# Patient Record
Sex: FEMALE | Race: WHITE
[De-identification: names, ages, dates, MRNs, and addresses within clinical notes are randomized per-mention and may not be internally consistent; named-entity substitution may affect disease eponyms.]

---

## 2020-08-10 ENCOUNTER — HOSPITAL ENCOUNTER (OUTPATIENT)
Dept: HOSPITAL 46 - DS | Age: 70
Discharge: HOME | End: 2020-08-10
Attending: SURGERY
Payer: MEDICARE

## 2020-08-10 VITALS — BODY MASS INDEX: 19.63 KG/M2 | WEIGHT: 114.99 LBS | HEIGHT: 64 IN

## 2020-08-10 DIAGNOSIS — Z96.651: ICD-10-CM

## 2020-08-10 DIAGNOSIS — Z12.11: Primary | ICD-10-CM

## 2020-08-10 DIAGNOSIS — Z88.2: ICD-10-CM

## 2020-08-10 DIAGNOSIS — K31.9: ICD-10-CM

## 2020-08-10 DIAGNOSIS — K57.30: ICD-10-CM

## 2020-08-10 DIAGNOSIS — Z79.899: ICD-10-CM

## 2020-08-10 DIAGNOSIS — K21.9: ICD-10-CM

## 2020-08-10 DIAGNOSIS — B37.81: ICD-10-CM

## 2020-08-10 PROCEDURE — 0DB78ZX EXCISION OF STOMACH, PYLORUS, VIA NATURAL OR ARTIFICIAL OPENING ENDOSCOPIC, DIAGNOSTIC: ICD-10-PCS | Performed by: SURGERY

## 2020-08-10 PROCEDURE — 0DBP8ZX EXCISION OF RECTUM, VIA NATURAL OR ARTIFICIAL OPENING ENDOSCOPIC, DIAGNOSTIC: ICD-10-PCS | Performed by: SURGERY

## 2020-08-10 PROCEDURE — 0DB38ZX EXCISION OF LOWER ESOPHAGUS, VIA NATURAL OR ARTIFICIAL OPENING ENDOSCOPIC, DIAGNOSTIC: ICD-10-PCS | Performed by: SURGERY

## 2020-08-10 PROCEDURE — 0DB58ZX EXCISION OF ESOPHAGUS, VIA NATURAL OR ARTIFICIAL OPENING ENDOSCOPIC, DIAGNOSTIC: ICD-10-PCS | Performed by: SURGERY

## 2020-08-10 PROCEDURE — G0500 MOD SEDAT ENDO SERVICE >5YRS: HCPCS

## 2020-08-10 NOTE — NUR
08/10/20 0828 Teresa Jose
0820- PT TO PACU IN SUPINE POSITION. AWAKENS EASILY TO VOICE.
RESPONDING TO QUESTIONS APPROPRIATELY. DENIES PAIN OR NAUSEA.
BREATHING EASY AND UNLABORED. SPO2 >95% ON 3 L O2 VIA NC. PT
ENCOURAGED TO PASS GAS.
 
0827- MD AT BEDSIDE TALKING WITH PATIENT. PT AWAKENS EASILY TO VOICE
AND TALKING APPROPRIATELY WITH MD. BREATHING EASY AND UNLABORED. O2
TITRATED DOWN TO ROOM AIR. SPO2 >95%
.

## 2020-08-10 NOTE — NUR
PT ALERT, ORIENTED AND SEEMS VERY MUCH IN CONTROL. PT MENTIONED THAT THIS EGD
IS  FOLLOW-UP TO CHECK FOR BLEEDING. ALL QUESTIONS ASKED-ANSWERED. PT ASKED
FOR PRAYER. WILL FOLLOW AS NEEDED

## 2020-08-11 NOTE — PATH
[facility]
                                    [address], [place]  [address]
_________________________________________________________________________________________
                                                                 Signed   
 
 
 
SPECIMEN(S): A ANTRUM/PYLORUS
SPECIMEN(S): B LOWER ESOPHAGUS
SPECIMEN(S): C ESOPHAGUS PLAQUES
SPECIMEN(S): D RECTUM
 
SPECIMEN SOURCE:
A. ANTRUM/PYLORUS
B. LOWER ESOPHAGUS
C. ESOPHAGUS PLAQUES
D. RECTUM
 
CLINICAL HISTORY:
Melena, history of antral ulcers.
MICROSCOPIC DESCRIPTION:
Histologic sections of all submitted blocks are examined by light microscopy. 
These findings, together with the gross examination, support the pathologic 
diagnosis. 
 
FINAL PATHOLOGIC DIAGNOSIS:
A. Stomach, antrum/pylorus, biopsy:
-  Reactive gastropathy.
-  Negative for Helicobacter organisms on HE stain.
-  Negative for dysplasia or malignancy.
B.  Esophagus, lower, biopsy:
-  Squamous mucosa with minimal chronic inflammation and reactive changes, 
suggestive of mild reflux esophagitis. 
-  Negative for intestinal metaplasia, dysplasia, or malignancy.
C.  Esophagus, plaques, biopsy:
-  Candidal esophagitis.
-  Negative for intestinal metaplasia, dysplasia, or malignancy.
D.  Rectum, biopsy:
-  Rectal mucosa with no histopathologic abnormality.
-  Negative for dysplasia or malignancy.
NAL:cml:C2NR
 
GROSS DESCRIPTION:
Four specimens are received in four containers, labeled "RC."
A.  The specimen, labeled "RC, antrum biopsy," is received in formalin and 
consists of two tan soft tissue fragments that measure 0.2 cm in greatest 
dimension.  The specimen is entirely submitted in 
cassette (A1).
 
                                                                                    
_________________________________________________________________________________________
PATIENT NAME:     NATALI CARLOSN                    
MEDICAL RECORD #: [id number]            PATHOLOGY                     
          ACCT #: [id number]       ACCESSION #: [id number]     
DATE OF BIRTH:   [birth year]            REPORT #: [id number]       
PHYSICIAN:        YODIT RENTERIA              
PCP:              DAVID JUAN PA-C         
REPORT IS CONFIDENTIAL AND NOT TO BE RELEASED WITHOUT AUTHORIZATION
 
 
                                  [facility]
                                    [address], [place]  [address]
_________________________________________________________________________________________
                                                                 Signed   
 
 
B.  The specimen, labeled "RC, esophagus biopsy," is received in formalin and 
consists of two tan soft tissue fragments that measure 0.2 cm in greatest 
dimension.  The specimen is entirely submitted 
in cassette (B1).
C.  The specimen, labeled "RC, esophagus plaques biopsy," is received in 
formalin and consists of five tan soft tissue fragments that measure 0.2 cm in 
greatest dimension.  The specimen is entirely 
submitted in cassette (C1).
D.  The specimen, labeled "RC, rectum biopsy," is received in formalin and 
consists of three tan soft tissue fragments that measure 0.2 cm in greatest 
dimension.  The specimen is entirely submitted in 
cassette (D1).
 
JS (under the direct supervision of a pathologist)
The Gross Description was prepared using a voice recognition system.  The 
report was reviewed for accuracy; however, sound-alike word errors, addition 
and/or deletions may occur.  If there is any 
question about this report, please contact Client Services.
 
PERFORMING LABORATORY:
The technical component was performed by [facility], [address] [place] [id number] (Medical Director: Lynn Bolden MD; CLIA# [id number]). 
Professional interpretation was performed by 
[facility], [address] [address] (CLIA# [id number]). 
 
Diagnostician:  Meka Kinney MD
Pathologist
Electronically Signed 08/11/2020
 
 
Copies:                                
~
 
 
 
 
 
 
 
 
 
 
                                                                                    
_________________________________________________________________________________________
PATIENT NAME:     NATALI CARLOS                    
MEDICAL RECORD #: [id number]            PATHOLOGY                     
          ACCT #: [id number]       ACCESSION #: [id number]     
DATE OF BIRTH:   [birth year]            REPORT #: [id number]       
PHYSICIAN:        YODIT PATHOLOGY              
PCP:              DAVID JUAN PA-C         
REPORT IS CONFIDENTIAL AND NOT TO BE RELEASED WITHOUT AUTHORIZATION

## 2020-08-11 NOTE — OR
[facility]
                                    [address], [place]  [address]
_________________________________________________________________________________________
                                                                 Signed   
 
 
DATE OF OPERATION:
08/10/2020
 
SURGEON:
Bob Crawley MD
 
PREOPERATIVE DIAGNOSES:
1. History of upper gastrointestinal bleeding in 2019 with pre-pyloric antral ulcers.
2. Surveillance history of mild proctitis.
 
POSTOPERATIVE DIAGNOSES:
1. Healed gastric ulcers.
2. Benign-appearing midesophageal plaques (biopsied).
3. Scattered diverticula of colon.
4. Mild proctitis (biopsied).
 
PROCEDURES:
1. Esophagogastroduodenoscopy with biopsy.
2. Total colonoscopy to cecum with rectal biopsy.
 
ANESTHESIA:
Intravenous sedation, fentanyl 150 mcg and Versed 5 mg.
 
INDICATIONS:
A 70-year-old white woman is a patient of David Bergman.  Last summer, she had melena
and anemia and was evaluated by Dr. Arias in August including upper endoscopy, which
showed pre-pyloric antral ulcerations.  She was treated with Protonix.  The patient does
take Fosamax and has maintained treatment with proton pump inhibitor therapy as well.
Additionally, she has a history of mild proctitis on prior colonoscopy.  She is admitted
at this time to undergo surveillance colonoscopy as well as upper endoscopy.  She
understands the risks of bleeding, infection, and perforation. 
 
FINDINGS:
Upper endoscopy showed no sign of persistent ulceration of the stomach in any way.
CLOtest was negative. 
 
There were esophageal plaques in the mid esophagus, which were biopsied and of little
consequence most likely. 
 
On colonoscopy, the prep was excellent.  Complete colonoscopy was undertaken to the
cecum.  There were scattered diverticula throughout.  There was no sign of stricture
neoplasm, no polyps.  There was persistent mild proctitis, for which biopsies were
 
    Electronically Signed By: BOB CRAWLEY MD  08/11/20 0748
_________________________________________________________________________________________
PATIENT NAME:     NATALI CARLOS                    
MEDICAL RECORD #: [id number]            OPERATIVE REPORT              
          ACCT #: [id number]  
DATE OF BIRTH:   [birth year]            REPORT #: [id number]      
PHYSICIAN:        BOB CRAWLEY MD                 
PCP:              DAVID BERGMAN PA-C         
REPORT IS CONFIDENTIAL AND NOT TO BE RELEASED WITHOUT AUTHORIZATION
 
 
                                  [facility]
                                    [address], [place]  [address]
_________________________________________________________________________________________
                                                                 Signed   
 
 
obtained. 
 
DESCRIPTION OF PROCEDURE:
The patient was brought to the endoscopy suite, given topical Hurricaine spray.
Hypopharyngeal anesthesia and placed in lateral decubitus position.  She was given
intravenous sedation to the point of slurred speech and nystagmus with full
cardiopulmonary monitoring.  A bite block was placed.  An Olympus video upper endoscope
passed by hypopharynx, vocal cords were normal.  Scope was passed down the esophagus,
where a few small plaques were noted in the mid esophagus.  Scope was passed to the
stomach, which was insufflated with air.  Rugal folds were normal.  There was no sign of
ulceration and close inspection of the antrum and pylorus showed no sign of ulceration
or other abnormalities.  Scope was passed through the pylorus into the duodenum, which
was normal.  The scope was withdrawn.  Biopsies taken of the antrum for both NIRALI and
pathologic testing.  Retroflexed view was undertaken showing a good flap valve and no
sign of proximal ulceration or gastritis.  The scope was withdrawn to the distal
esophagus.  Biopsies were obtained there, as there was mild inflammation possibly.  The
scope was withdrawn and a white mid esophageal plaques were noted.  There were not
worrisome in appearance, but were biopsied nevertheless.  Further withdrawal of scope
showed no sign of other abnormality.  There was no sign of Butcher epithelium. 
 
Plans were then made for colonoscopy.  Additional sedation was given.  Digital rectal
examination was found to be normal.  An Olympus video colonoscope was passed into the
rectum and manipulated throughout the colon, showing an excellent bowel prep overall.
The scope was ultimately advanced to the cecum.  The cecum appeared normal as did the
appendiceal orifice.  The scope was withdrawn.  Careful inspection showed no sign of
abnormality other than a few scattered diverticula.  In the rectum was mild proctitis,
not unlike previous findings.  Biopsies were obtained.  Scope was retroflexed and
ultimately removed.  The patient was taken to the recovery room in good condition. 
 
CONCLUDING DIAGNOSES:
1. Resolved gastric ulcers.
2. Mid esophageal plaques (biopsied).
3. Scattered diverticula.
4. Mild proctitis.
 
PLAN:
She will return to the ongoing care of David Bergman.  I would recommend repeat
colonoscopy in 10 years sooner if clinically indicated.  We would recommend continued
use of Protonix under the circumstances of her prior ulceration.  Mindful of theoretic
risks of calcium metabolism sequela while on PPI medication. 
 
 
 
    Electronically Signed By: BOB CRAWLEY MD  08/11/20 0748
_________________________________________________________________________________________
PATIENT NAME:     NATALI CARLOS                    
MEDICAL RECORD #: [id number]            OPERATIVE REPORT              
          ACCT #: [id number]  
DATE OF BIRTH:   [birth year]            REPORT #: [id number]      
PHYSICIAN:        BOB CRAWLEY MD                 
PCP:              DAVID BERGMAN PA-C         
REPORT IS CONFIDENTIAL AND NOT TO BE RELEASED WITHOUT AUTHORIZATION
 
 
                                  [facility]
                                    [address] [place] [address]  [address]
_________________________________________________________________________________________
                                                                 Signed   
 
 
 
            ________________________________________
            Bob Crawley MD 
 
 
JM/MODL
Job #:  [id number]
DD:  08/10/2020 08:23:35
DT:  08/10/2020 13:22:23
 
 
Copies:                                
~
 
 
 
 
 
 
 
 
 
 
 
 
 
 
 
 
 
 
 
 
 
 
 
 
 
 
 
 
 
 
    Electronically Signed By: BOB CRAWLEY MD  08/11/20 0748
_________________________________________________________________________________________
PATIENT NAME:     NATALI CARLOS                    
MEDICAL RECORD #: [id number]            OPERATIVE REPORT              
          ACCT #: [id number]  
DATE OF BIRTH:   [birth year]            REPORT #: [id number]      
PHYSICIAN:        BOB CRAWLEY MD                 
PCP:              DAVID BERGMAN PA-C         
REPORT IS CONFIDENTIAL AND NOT TO BE RELEASED WITHOUT AUTHORIZATION

## 2023-07-10 ENCOUNTER — HOSPITAL ENCOUNTER (EMERGENCY)
Dept: HOSPITAL 46 - ED | Age: 73
Discharge: HOME | End: 2023-07-10
Payer: MEDICARE

## 2023-07-10 VITALS — WEIGHT: 115 LBS | HEIGHT: 64 IN | BODY MASS INDEX: 19.63 KG/M2

## 2023-07-10 VITALS — SYSTOLIC BLOOD PRESSURE: 163 MMHG | DIASTOLIC BLOOD PRESSURE: 88 MMHG

## 2023-07-10 DIAGNOSIS — Z79.899: ICD-10-CM

## 2023-07-10 DIAGNOSIS — I83.892: Primary | ICD-10-CM

## 2023-07-10 DIAGNOSIS — Z88.2: ICD-10-CM
